# Patient Record
Sex: FEMALE | Race: WHITE | NOT HISPANIC OR LATINO | Employment: FULL TIME | URBAN - NONMETROPOLITAN AREA
[De-identification: names, ages, dates, MRNs, and addresses within clinical notes are randomized per-mention and may not be internally consistent; named-entity substitution may affect disease eponyms.]

---

## 2018-03-16 ENCOUNTER — TRANSCRIBE ORDERS (OUTPATIENT)
Dept: ADMINISTRATIVE | Facility: HOSPITAL | Age: 43
End: 2018-03-16

## 2018-03-16 DIAGNOSIS — Z12.31 ENCOUNTER FOR SCREENING MAMMOGRAM FOR MALIGNANT NEOPLASM OF BREAST: Primary | ICD-10-CM

## 2018-04-20 ENCOUNTER — HOSPITAL ENCOUNTER (OUTPATIENT)
Dept: MAMMOGRAPHY | Facility: HOSPITAL | Age: 43
Discharge: HOME | End: 2018-04-20
Attending: OBSTETRICS & GYNECOLOGY | Admitting: OBSTETRICS & GYNECOLOGY

## 2018-04-20 DIAGNOSIS — Z12.31 ENCOUNTER FOR SCREENING MAMMOGRAM FOR MALIGNANT NEOPLASM OF BREAST: ICD-10-CM

## 2018-04-20 PROCEDURE — 77067 SCR MAMMO BI INCL CAD: CPT

## 2018-04-20 PROCEDURE — 77063 BREAST TOMOSYNTHESIS BI: CPT

## 2019-04-25 ENCOUNTER — TRANSCRIBE ORDERS (OUTPATIENT)
Dept: PULMONOLOGY | Facility: HOSPITAL | Age: 44
End: 2019-04-25

## 2019-04-25 DIAGNOSIS — Z12.39 SCREENING BREAST EXAMINATION: Primary | ICD-10-CM

## 2024-05-21 ENCOUNTER — OFFICE VISIT (OUTPATIENT)
Age: 49
End: 2024-05-21
Payer: COMMERCIAL

## 2024-05-21 VITALS
DIASTOLIC BLOOD PRESSURE: 86 MMHG | WEIGHT: 221.2 LBS | SYSTOLIC BLOOD PRESSURE: 124 MMHG | BODY MASS INDEX: 37.76 KG/M2 | HEIGHT: 64 IN

## 2024-05-21 DIAGNOSIS — Z00.00 ENCOUNTER FOR ANNUAL PHYSICAL EXAMINATION EXCLUDING GYNECOLOGICAL EXAMINATION IN A PATIENT OLDER THAN 17 YEARS: ICD-10-CM

## 2024-05-21 DIAGNOSIS — Z30.011 ORAL CONTRACEPTION INITIAL PRESCRIPTION: Primary | ICD-10-CM

## 2024-05-21 PROCEDURE — 87624 HPV HI-RISK TYP POOLED RSLT: CPT | Performed by: OBSTETRICS & GYNECOLOGY

## 2024-05-21 PROCEDURE — G0123 SCREEN CERV/VAG THIN LAYER: HCPCS | Performed by: OBSTETRICS & GYNECOLOGY

## 2024-05-21 RX ORDER — LEVONORGESTREL/ETHIN.ESTRADIOL 0.1-0.02MG
1 TABLET ORAL DAILY
Qty: 28 TABLET | Refills: 12 | Status: SHIPPED | OUTPATIENT
Start: 2024-05-21 | End: 2024-05-31

## 2024-05-21 RX ORDER — SUCRALFATE 1 G
1 TABLET ORAL 4 TIMES DAILY
COMMUNITY
Start: 2024-02-01 | End: 2024-07-30

## 2024-05-21 RX ORDER — TOPIRAMATE 100 MG/1
1 TABLET, FILM COATED ORAL EVERY 12 HOURS SCHEDULED
COMMUNITY
Start: 2024-05-17

## 2024-05-21 RX ORDER — METFORMIN HYDROCHLORIDE 500 MG/1
500 TABLET, EXTENDED RELEASE ORAL 2 TIMES DAILY
COMMUNITY
Start: 2024-05-20

## 2024-05-21 RX ORDER — LEVONORGESTREL/ETHIN.ESTRADIOL 0.1-0.02MG
1 TABLET ORAL DAILY
COMMUNITY
End: 2024-05-31

## 2024-05-21 RX ORDER — FLUTICASONE PROPIONATE 50 MCG
1 SPRAY, SUSPENSION (ML) NASAL DAILY
COMMUNITY

## 2024-05-21 RX ORDER — CHOLECALCIFEROL (VITAMIN D3) 1250 MCG
50000 CAPSULE ORAL
COMMUNITY
Start: 2024-05-17

## 2024-05-21 RX ORDER — CYANOCOBALAMIN 1000 UG/ML
1000 INJECTION, SOLUTION INTRAMUSCULAR; SUBCUTANEOUS
COMMUNITY
Start: 2024-05-17

## 2024-05-21 RX ORDER — TRAZODONE HYDROCHLORIDE 150 MG/1
150 TABLET ORAL NIGHTLY
COMMUNITY

## 2024-05-21 RX ORDER — OMEPRAZOLE 40 MG/1
40 CAPSULE, DELAYED RELEASE ORAL DAILY
COMMUNITY

## 2024-05-23 LAB
GEN CATEG CVX/VAG CYTO-IMP: NORMAL
HPV I/H RISK 4 DNA CVX QL PROBE+SIG AMP: NOT DETECTED
LAB AP CASE REPORT: NORMAL
LAB AP GYN ADDITIONAL INFORMATION: NORMAL
Lab: NORMAL
PATH INTERP SPEC-IMP: NORMAL
STAT OF ADQ CVX/VAG CYTO-IMP: NORMAL

## 2024-05-31 ENCOUNTER — TELEPHONE (OUTPATIENT)
Dept: OBSTETRICS AND GYNECOLOGY | Age: 49
End: 2024-05-31

## 2024-05-31 ENCOUNTER — TELEPHONE (OUTPATIENT)
Age: 49
End: 2024-05-31
Payer: COMMERCIAL

## 2024-05-31 DIAGNOSIS — Z30.011 ENCOUNTER FOR PRESCRIPTION OF ORAL CONTRACEPTIVES: Primary | ICD-10-CM

## 2024-05-31 DIAGNOSIS — F17.200 SMOKER: ICD-10-CM

## 2024-05-31 RX ORDER — DROSPIRENONE 4 MG/1
4 TABLET, FILM COATED ORAL DAILY
Qty: 28 TABLET | Refills: 11 | Status: SHIPPED | OUTPATIENT
Start: 2024-05-31 | End: 2025-05-31

## 2024-05-31 NOTE — PROGRESS NOTES
"Subjective     Gayle Rodriguez is a 48 y.o. female here for annual exam. Pt reports she  had heavier menses and was started on lessina. Now with only very light menses or absent.  Would like refills on OCPs. Feels like she some times has perimenopausal symptoms.  Non smoker now. No contraindications to OCPs.  Mammogram 1/2024 WNL. Last pap smear WNL but would like a pap today. No gyn complaints. Denies vaginal discharge, dyspareunia, pelvic pain. Denies breast changes. Denies urinary symptoms.     Gynecologic Exam  The patient's pertinent negatives include no pelvic pain or vaginal discharge. Pertinent negatives include no constipation, diarrhea, nausea or vomiting.         /86   Ht 162.6 cm (64\")   Wt 100 kg (221 lb 3.2 oz)   BMI 37.97 kg/m²     Outpatient Encounter Medications as of 5/21/2024   Medication Sig Dispense Refill    Carafate 1 g tablet Take 1 tablet by mouth 4 (Four) Times a Day. PRN      Cholecalciferol (Vitamin D3) 1.25 MG (66741 UT) capsule Take 1 capsule by mouth Every 7 (Seven) Days.      cyanocobalamin 1000 MCG/ML injection Inject 1 mL into the appropriate muscle as directed by prescriber Every 30 (Thirty) Days.      fluticasone (FLONASE) 50 MCG/ACT nasal spray 1 spray into the nostril(s) as directed by provider Daily. prn      levonorgestrel-ethinyl estradiol (Lessina) 0.1-20 MG-MCG per tablet Take 1 tablet by mouth Daily.      metFORMIN ER (GLUCOPHAGE-XR) 500 MG 24 hr tablet Take 1 tablet by mouth 2 (Two) Times a Day.      omeprazole (priLOSEC) 40 MG capsule Take 1 capsule by mouth Daily.      topiramate (TOPAMAX) 100 MG tablet Take 1 tablet by mouth Every 12 (Twelve) Hours.      traZODone (DESYREL) 150 MG tablet Take 1 tablet by mouth Every Night.      levonorgestrel-ethinyl estradiol (AVIANE,ALESSE,LESSINA) 0.1-20 MG-MCG per tablet Take 1 tablet by mouth Daily. 28 tablet 12     No facility-administered encounter medications on file as of 5/21/2024.       Surgical History  Past " Surgical History:   Procedure Laterality Date    BREAST BIOPSY Right [id number]    benign    D & C WITH SUCTION  2005       Family History  Family History   Problem Relation Age of Onset    Breast cancer Neg Hx     Ovarian cancer Neg Hx     Uterine cancer Neg Hx     Colon cancer Neg Hx     Melanoma Neg Hx        The following portions of the patient's history were reviewed and updated as appropriate: allergies, current medications, past family history, past medical history, past social history, past surgical history, and problem list.    Review of Systems   Constitutional:  Negative for activity change, appetite change, unexpected weight gain and unexpected weight loss.   Respiratory:  Negative for cough, chest tightness, shortness of breath and wheezing.    Cardiovascular:  Negative for chest pain and palpitations.   Gastrointestinal:  Negative for blood in stool, constipation, diarrhea, nausea and vomiting.   Genitourinary:  Negative for breast discharge, breast lump, breast pain, dyspareunia, menstrual problem, pelvic pain, pelvic pressure, urinary incontinence, vaginal bleeding, vaginal discharge and vaginal pain.   Psychiatric/Behavioral:  Negative for depressed mood.        Objective   Physical Exam  Exam conducted with a chaperone present.   Constitutional:       Appearance: Normal appearance.   Cardiovascular:      Rate and Rhythm: Normal rate and regular rhythm.      Pulses: Normal pulses.   Pulmonary:      Effort: Pulmonary effort is normal.      Breath sounds: Normal breath sounds.   Chest:   Breasts:     Right: Normal. No inverted nipple, mass, nipple discharge or skin change.      Left: Normal. No inverted nipple, mass, nipple discharge or skin change.   Abdominal:      General: Abdomen is flat. Bowel sounds are normal.      Palpations: Abdomen is soft.   Genitourinary:     Comments: Normal external genitalia, vaginal mucosa pink without lesions, cervix smooth without lesions, no prolapse, bimanual exam  with anteverted uterus, normal size, no masses, no CMT, nontender, no vaginal discharge  Musculoskeletal:      Cervical back: Normal range of motion and neck supple.   Neurological:      Mental Status: She is alert.   Psychiatric:         Mood and Affect: Mood normal.         Behavior: Behavior normal.         Assessment & Plan   Diagnoses and all orders for this visit:    1. Oral contraception initial prescription (Primary)  -     Liquid-based Pap Smear, Screening  -     HPV DNA Probe, Direct - ThinPrep Vial, Cervix, Endocervix  Refill Lessian daily for contraception.   2. Encounter for annual physical examination excluding gynecological examination in a patient older than 17 years  47 y/o CF here for annual exam. Pap smear today. Mammogram 1/2024 WNL.  Declines cologuard and/or screening colonoscopy. RTC yearly. Normal breast and pelvic exam today.   Other orders  -     levonorgestrel-ethinyl estradiol (AVIANE,ALESSE,LESSINA) 0.1-20 MG-MCG per tablet; Take 1 tablet by mouth Daily.  Dispense: 28 tablet; Refill: 12    BMI Body mass index is 37.97 kg/m²..   Colonoscopy: Declines today  Mammogram: Up to date  DEXA:  not applicable  Pap smear, per ASCCP guidelines, Done today  STI screening: declines  Contraception: desires    AVS/Patient education handout on the following as well w/discussion  Encouraged self breast awareness.  Encouraged proactive weight management and importance of maintaining a healthy weight.   Encouraged regular exercise and the importance of same, in regards to a healthy heart as well as helping to maintain her weight and improving her mental health.       Class 2 Severe Obesity (BMI >=35 and <=39.9). Obesity-related health conditions include the following: none. Obesity is unchanged. BMI is is above average; BMI management plan is completed. We discussed portion control and increasing exercise.      Carolina Tang MD  5/31/2024

## 2024-05-31 NOTE — TELEPHONE ENCOUNTER
Called pt to confirm if smoker or non-smoker, currently taking Lessina. If smoker need to change to progesterone only. Pt stated that is a social smoker. Informed will let Dr Tang know and see about changing ocp.    Per Dr Tang need to change ocp to Slynd or progesterone only birth control. Called pt, informed need to switch to progesterone only birth control, can change to slynd, IUD or depo. Pt requested to switch to Slynd. Sent in rx.

## 2024-05-31 NOTE — TELEPHONE ENCOUNTER
Called pt, pt was upset that couldn't stay on current ocp. Informed pt again that contraindicated since socially smokes. Pt stated will call local pharmacy that sent rx to see what out of pocket cost will be, if any problems or questions will call back first of next wk.

## 2024-05-31 NOTE — TELEPHONE ENCOUNTER
Caller: Gayle Rodriguez    Relationship to patient: Self    Best call back number: [phone] (home)       Patient is needing: PT CALLED IN REQUESTING TO SPEAK WITH GENARO, WAS TOLD BY PHARMACY THAT THE SLYND WAS NOT COVERED BY INSURANCE. REQUESTS CALL BACK FROM CLINICAL STAFF TO DISCUSS OPTIONS.

## 2024-12-18 ENCOUNTER — TELEPHONE (OUTPATIENT)
Age: 49
End: 2024-12-18
Payer: COMMERCIAL

## 2024-12-18 DIAGNOSIS — Z12.31 ENCOUNTER FOR SCREENING MAMMOGRAM FOR MALIGNANT NEOPLASM OF BREAST: Primary | ICD-10-CM

## 2024-12-18 NOTE — TELEPHONE ENCOUNTER
[facility] called to request mammogram order to be faxed to [phone]. Order placed and sent.

## 2025-01-20 DIAGNOSIS — Z12.31 ENCOUNTER FOR SCREENING MAMMOGRAM FOR MALIGNANT NEOPLASM OF BREAST: ICD-10-CM

## 2025-05-30 ENCOUNTER — OFFICE VISIT (OUTPATIENT)
Age: 50
End: 2025-05-30
Payer: COMMERCIAL

## 2025-05-30 VITALS
WEIGHT: 190 LBS | HEIGHT: 64 IN | DIASTOLIC BLOOD PRESSURE: 72 MMHG | BODY MASS INDEX: 32.44 KG/M2 | SYSTOLIC BLOOD PRESSURE: 116 MMHG

## 2025-05-30 DIAGNOSIS — Z12.31 ENCOUNTER FOR SCREENING MAMMOGRAM FOR BREAST CANCER: ICD-10-CM

## 2025-05-30 DIAGNOSIS — Z30.011 ENCOUNTER FOR PRESCRIPTION OF ORAL CONTRACEPTIVES: ICD-10-CM

## 2025-05-30 DIAGNOSIS — Z01.419 WELL WOMAN EXAM WITH ROUTINE GYNECOLOGICAL EXAM: Primary | ICD-10-CM

## 2025-05-30 DIAGNOSIS — Z12.4 ENCOUNTER FOR SCREENING FOR CERVICAL CANCER: ICD-10-CM

## 2025-05-30 PROCEDURE — G0123 SCREEN CERV/VAG THIN LAYER: HCPCS

## 2025-05-30 PROCEDURE — 87624 HPV HI-RISK TYP POOLED RSLT: CPT

## 2025-05-30 RX ORDER — TIMOLOL MALEATE 5 MG/ML
1 SOLUTION/ DROPS OPHTHALMIC DAILY
COMMUNITY
Start: 2025-05-13 | End: 2025-05-30 | Stop reason: SDUPTHER

## 2025-05-30 RX ORDER — TIZANIDINE 2 MG/1
TABLET ORAL
COMMUNITY
Start: 2024-12-03

## 2025-05-30 RX ORDER — TRAZODONE HYDROCHLORIDE 150 MG/1
TABLET ORAL
COMMUNITY
Start: 2025-03-19

## 2025-05-30 RX ORDER — ROSUVASTATIN CALCIUM 5 MG/1
5 TABLET, COATED ORAL DAILY
COMMUNITY

## 2025-05-30 RX ORDER — TIMOLOL MALEATE 5 MG/ML
1 SOLUTION/ DROPS OPHTHALMIC DAILY
Qty: 28 TABLET | Refills: 11 | Status: SHIPPED | OUTPATIENT
Start: 2025-05-30

## 2025-05-30 NOTE — PROGRESS NOTES
"Subjective     Gayle Rodriguez is a 49 y.o. female    History of Present Illness  The patient presents to [facility] office today for completion of her WWE. She is requesting refills of her current ocp and wishes to continue. She denies having any other GYN concerns to address today.  Gynecologic Exam  The patient's pertinent negatives include no genital itching, genital lesions, genital odor, genital rash, missed menses, pelvic pain, vaginal bleeding or vaginal discharge. The patient is experiencing no pain. Pertinent negatives include no abdominal pain, anorexia, back pain, chills, constipation, diarrhea, discolored urine, dysuria, fever, flank pain, frequency, headaches, hematuria, joint pain, joint swelling, nausea, painful intercourse, rash, sore throat, urgency or vomiting. She is sexually active. No, her partner does not have an STD. She uses oral contraceptives for contraception. Her menstrual history has been regular. The maximum temperature recorded prior to her arrival was no fever.         /72   Ht 162.6 cm (64\")   Wt 86.2 kg (190 lb)   BMI 32.61 kg/m²     Outpatient Encounter Medications as of 5/30/2025   Medication Sig Dispense Refill    Cholecalciferol (Vitamin D3) 1.25 MG (67644 UT) capsule Take 1 capsule by mouth Every 7 (Seven) Days.      fluticasone (FLONASE) 50 MCG/ACT nasal spray Administer 1 spray into the nostril(s) as directed by provider Daily. prn      omeprazole (priLOSEC) 40 MG capsule Take 1 capsule by mouth Daily.      rosuvastatin (CRESTOR) 5 MG tablet Take 1 tablet by mouth Daily.      tiZANidine (ZANAFLEX) 2 MG tablet See Instructions, PRN muscle spasm, 1-2 tablets every 8 hours as needed for muscle spasm, # 42 EA, 1 Refill(s), Pharmacy: [facility], 162.56, cm, 12/03/24 11:34:00 CST, Height, 90.81, kg, 12/03/24 11:40:00 CST, Weight Dosing      traZODone (DESYREL) 150 MG tablet Take  by mouth.      Vienva 0.1-20 MG-MCG per tablet Take 1 tablet by mouth " Daily. 28 tablet 11    [DISCONTINUED] Vienva 0.1-20 MG-MCG per tablet Take 1 tablet by mouth Daily.      cyanocobalamin 1000 MCG/ML injection Inject 1 mL into the appropriate muscle as directed by prescriber Every 30 (Thirty) Days.      [DISCONTINUED] Drospirenone (Slynd) 4 MG tablet Take 1 tablet by mouth Daily. 28 tablet 11    [DISCONTINUED] metFORMIN ER (GLUCOPHAGE-XR) 500 MG 24 hr tablet Take 1 tablet by mouth 2 (Two) Times a Day.      [DISCONTINUED] topiramate (TOPAMAX) 100 MG tablet Take 1 tablet by mouth Every 12 (Twelve) Hours.      [DISCONTINUED] traZODone (DESYREL) 150 MG tablet Take 1 tablet by mouth Every Night.       No facility-administered encounter medications on file as of 5/30/2025.       Past Medical History  Past Medical History:   Diagnosis Date    Abnormal Pap smear of cervix     Cervical dysplasia        Surgical History  Past Surgical History:   Procedure Laterality Date    BREAST BIOPSY Right [id number]    benign    D & C WITH SUCTION  2005       Family History  Family History   Problem Relation Age of Onset    Breast cancer Neg Hx     Ovarian cancer Neg Hx     Uterine cancer Neg Hx     Colon cancer Neg Hx     Melanoma Neg Hx        The following portions of the patient's history were reviewed and updated as appropriate: allergies, current medications, past family history, past medical history, past social history, past surgical history, and problem list.    Review of Systems   Constitutional: Negative.  Negative for chills and fever.   HENT: Negative.  Negative for sore throat.    Eyes: Negative.    Respiratory: Negative.     Cardiovascular: Negative.    Gastrointestinal: Negative.  Negative for abdominal pain, anorexia, constipation, diarrhea, nausea and vomiting.   Endocrine: Negative.    Genitourinary: Negative.  Negative for breast discharge, breast lump, breast pain, dysuria, flank pain, frequency, hematuria, missed menses, pelvic pain, urgency and vaginal discharge.   Musculoskeletal:  Negative.  Negative for back pain and joint pain.   Skin: Negative.  Negative for rash.   Allergic/Immunologic: Negative.    Neurological: Negative.    Hematological: Negative.    Psychiatric/Behavioral: Negative.         Objective   Physical Exam  Vitals and nursing note reviewed. Exam conducted with a chaperone present (Johanny Mccarthy ma).   Constitutional:       General: She is not in acute distress.     Appearance: She is well-developed. She is obese. She is not diaphoretic.   HENT:      Head: Normocephalic.      Right Ear: External ear normal.      Left Ear: External ear normal.      Nose: Nose normal.   Eyes:      General: No scleral icterus.        Right eye: No discharge.         Left eye: No discharge.      Conjunctiva/sclera: Conjunctivae normal.      Pupils: Pupils are equal, round, and reactive to light.   Neck:      Thyroid: No thyromegaly.      Vascular: No carotid bruit.      Trachea: No tracheal deviation.   Cardiovascular:      Rate and Rhythm: Normal rate and regular rhythm.      Pulses: Normal pulses.      Heart sounds: Normal heart sounds. No murmur heard.  Pulmonary:      Effort: Pulmonary effort is normal. No respiratory distress.      Breath sounds: Normal breath sounds. No wheezing.   Chest:   Breasts:     Breasts are symmetrical.      Right: Normal. No swelling, bleeding, inverted nipple, mass, nipple discharge, skin change or tenderness.      Left: Normal. No swelling, bleeding, inverted nipple, mass, nipple discharge, skin change or tenderness.   Abdominal:      General: Bowel sounds are normal. There is no distension.      Palpations: Abdomen is soft. There is no mass.      Tenderness: There is no abdominal tenderness. There is no right CVA tenderness, left CVA tenderness or guarding.      Hernia: No hernia is present. There is no hernia in the left inguinal area or right inguinal area.   Genitourinary:     General: Normal vulva.      Exam position: Lithotomy position.      Labia:          Right: No rash, tenderness, lesion or injury.         Left: No rash, tenderness, lesion or injury.       Vagina: Normal. No signs of injury and foreign body. No vaginal discharge, erythema, tenderness or bleeding.      Cervix: Normal.      Uterus: Normal. Not enlarged, not fixed and not tender.       Adnexa: Right adnexa normal and left adnexa normal.        Right: No mass, tenderness or fullness.          Left: No mass, tenderness or fullness.        Rectum: Normal. No mass.      Comments:   BSU normal  Urethral meatus  Normal  Perineum  Normal  Musculoskeletal:         General: No tenderness. Normal range of motion.      Cervical back: Normal range of motion and neck supple.   Lymphadenopathy:      Head:      Right side of head: No submental, submandibular, tonsillar, preauricular, posterior auricular or occipital adenopathy.      Left side of head: No submental, submandibular, tonsillar, preauricular, posterior auricular or occipital adenopathy.      Cervical: No cervical adenopathy.      Right cervical: No superficial, deep or posterior cervical adenopathy.     Left cervical: No superficial, deep or posterior cervical adenopathy.      Upper Body:      Right upper body: No supraclavicular, axillary or pectoral adenopathy.      Left upper body: No supraclavicular, axillary or pectoral adenopathy.      Lower Body: No right inguinal adenopathy. No left inguinal adenopathy.   Skin:     General: Skin is warm and dry.      Findings: No bruising, erythema or rash.   Neurological:      Mental Status: She is alert and oriented to person, place, and time.      Coordination: Coordination normal.   Psychiatric:         Mood and Affect: Mood normal.         Behavior: Behavior normal.         Thought Content: Thought content normal.         Judgment: Judgment normal.          Assessment & Plan   Diagnoses and all orders for this visit:    1. Well woman exam with routine gynecological exam (Primary)  Comments:  The patient  presents to the office today for her WWE. She is established with her pcp and screening labs are current. Patient denies having cycles with use of ocp. She is unsure of menopausal state but does not wish to stop use of her ocp.    2. Encounter for screening for cervical cancer  Comments:  ASCCP guidelines discussed, patient desires screening pap smear today. She is sexually active, declines std testing. Normal pelvic exam.  Orders:  -     Liquid-based Pap Smear, Screening    3. Encounter for screening mammogram for breast cancer  Comments:  Last mammogram performed 1/13/25, normal. Will send with patient order for next year's mammogram to be scheduled at [facility].  Orders:  -     Mammo Screening Digital Tomosynthesis Bilateral With CAD; Future    4. Encounter for prescription of oral contraceptives  Comments:  Patient doing well with current ocp and wishes to continue. She v/u that age, smoking, and use of combination ocp can elevate cv risk.    Other orders  -     Vienva 0.1-20 MG-MCG per tablet; Take 1 tablet by mouth Daily.  Dispense: 28 tablet; Refill: 11         Normal GYN exam. Encouraged SBE, pt is aware how to do self breast exam and the importance of same. Discussed weight management and importance of maintaining a healthy weight. Discussed Vitamin D intake and the importance of adequate vitamin D for both bone health and a healthy immune system.  Discussed daily exercise and the importance of same, in regards to a healthy heart as well as helping to maintain her weight and improving her mental health.  Colonoscopy declines. Mammogram is up to date. Bone density is not indicated. Pap smear is done per ASCCP guidelines. HPV is done. Lab work up is up to date.      BMI is >= 30 and <35. (Class 1 Obesity). The following options were offered after discussion;: information on healthy weight added to patient's after visit summary       Yolette Javier, FRIDA  5/30/2025
